# Patient Record
Sex: FEMALE | Race: WHITE | NOT HISPANIC OR LATINO | Employment: OTHER | URBAN - METROPOLITAN AREA
[De-identification: names, ages, dates, MRNs, and addresses within clinical notes are randomized per-mention and may not be internally consistent; named-entity substitution may affect disease eponyms.]

---

## 2022-10-07 LAB
CREATININE (EXTERNAL): 0.8 MG/DL (ref 0.7–1.4)
GFR ESTIMATED (EXTERNAL): >60 ML/MIN/1.73M2
GLUCOSE (EXTERNAL): 114 MG/DL (ref 60–99)
POTASSIUM (EXTERNAL): 3.9 MMOL/L (ref 3.5–5.1)

## 2022-10-14 ENCOUNTER — TRANSFERRED RECORDS (OUTPATIENT)
Dept: HEALTH INFORMATION MANAGEMENT | Facility: CLINIC | Age: 87
End: 2022-10-14

## 2022-10-14 ENCOUNTER — MEDICAL CORRESPONDENCE (OUTPATIENT)
Dept: HEALTH INFORMATION MANAGEMENT | Facility: CLINIC | Age: 87
End: 2022-10-14

## 2022-11-06 ENCOUNTER — LAB REQUISITION (OUTPATIENT)
Dept: LAB | Facility: CLINIC | Age: 87
End: 2022-11-06
Payer: MEDICARE

## 2022-11-06 DIAGNOSIS — I10 ESSENTIAL (PRIMARY) HYPERTENSION: ICD-10-CM

## 2022-11-06 DIAGNOSIS — R42 DIZZINESS AND GIDDINESS: ICD-10-CM

## 2022-11-06 DIAGNOSIS — E55.9 VITAMIN D DEFICIENCY, UNSPECIFIED: ICD-10-CM

## 2022-11-08 LAB
ANION GAP SERPL CALCULATED.3IONS-SCNC: 13 MMOL/L (ref 7–15)
BUN SERPL-MCNC: 18.3 MG/DL (ref 8–23)
CALCIUM SERPL-MCNC: 9.6 MG/DL (ref 8.2–9.6)
CHLORIDE SERPL-SCNC: 93 MMOL/L (ref 98–107)
CREAT SERPL-MCNC: 0.82 MG/DL (ref 0.51–0.95)
DEPRECATED HCO3 PLAS-SCNC: 21 MMOL/L (ref 22–29)
ERYTHROCYTE [DISTWIDTH] IN BLOOD BY AUTOMATED COUNT: 15.5 % (ref 10–15)
GFR SERPL CREATININE-BSD FRML MDRD: 67 ML/MIN/1.73M2
GLUCOSE SERPL-MCNC: 91 MG/DL (ref 70–99)
HCT VFR BLD AUTO: 36 % (ref 35–47)
HGB BLD-MCNC: 11.8 G/DL (ref 11.7–15.7)
MCH RBC QN AUTO: 30 PG (ref 26.5–33)
MCHC RBC AUTO-ENTMCNC: 32.8 G/DL (ref 31.5–36.5)
MCV RBC AUTO: 92 FL (ref 78–100)
PLATELET # BLD AUTO: 445 10E3/UL (ref 150–450)
POTASSIUM SERPL-SCNC: 4.4 MMOL/L (ref 3.4–5.3)
RBC # BLD AUTO: 3.93 10E6/UL (ref 3.8–5.2)
SODIUM SERPL-SCNC: 127 MMOL/L (ref 136–145)
TSH SERPL DL<=0.005 MIU/L-ACNC: 3.43 UIU/ML (ref 0.3–4.2)
VIT B12 SERPL-MCNC: 1036 PG/ML (ref 232–1245)
WBC # BLD AUTO: 18.4 10E3/UL (ref 4–11)

## 2022-11-08 PROCEDURE — 82607 VITAMIN B-12: CPT | Mod: ORL | Performed by: FAMILY MEDICINE

## 2022-11-08 PROCEDURE — P9603 ONE-WAY ALLOW PRORATED MILES: HCPCS | Mod: ORL | Performed by: FAMILY MEDICINE

## 2022-11-08 PROCEDURE — 36415 COLL VENOUS BLD VENIPUNCTURE: CPT | Mod: ORL | Performed by: FAMILY MEDICINE

## 2022-11-08 PROCEDURE — 85027 COMPLETE CBC AUTOMATED: CPT | Mod: ORL | Performed by: FAMILY MEDICINE

## 2022-11-08 PROCEDURE — 84443 ASSAY THYROID STIM HORMONE: CPT | Mod: ORL | Performed by: FAMILY MEDICINE

## 2022-11-08 PROCEDURE — 80048 BASIC METABOLIC PNL TOTAL CA: CPT | Mod: ORL | Performed by: FAMILY MEDICINE

## 2022-11-13 ENCOUNTER — LAB REQUISITION (OUTPATIENT)
Dept: LAB | Facility: CLINIC | Age: 87
End: 2022-11-13
Payer: MEDICARE

## 2022-11-13 DIAGNOSIS — D72.829 ELEVATED WHITE BLOOD CELL COUNT, UNSPECIFIED: ICD-10-CM

## 2022-11-13 DIAGNOSIS — E87.1 HYPO-OSMOLALITY AND HYPONATREMIA: ICD-10-CM

## 2022-11-15 LAB
ANION GAP SERPL CALCULATED.3IONS-SCNC: 14 MMOL/L (ref 7–15)
BUN SERPL-MCNC: 19.5 MG/DL (ref 8–23)
CALCIUM SERPL-MCNC: 9.7 MG/DL (ref 8.2–9.6)
CHLORIDE SERPL-SCNC: 94 MMOL/L (ref 98–107)
CREAT SERPL-MCNC: 0.85 MG/DL (ref 0.51–0.95)
DEPRECATED HCO3 PLAS-SCNC: 23 MMOL/L (ref 22–29)
ERYTHROCYTE [DISTWIDTH] IN BLOOD BY AUTOMATED COUNT: 15.7 % (ref 10–15)
GFR SERPL CREATININE-BSD FRML MDRD: 64 ML/MIN/1.73M2
GLUCOSE SERPL-MCNC: 94 MG/DL (ref 70–99)
HCT VFR BLD AUTO: 36.5 % (ref 35–47)
HGB BLD-MCNC: 11.7 G/DL (ref 11.7–15.7)
MCH RBC QN AUTO: 30 PG (ref 26.5–33)
MCHC RBC AUTO-ENTMCNC: 32.1 G/DL (ref 31.5–36.5)
MCV RBC AUTO: 94 FL (ref 78–100)
PLATELET # BLD AUTO: 458 10E3/UL (ref 150–450)
POTASSIUM SERPL-SCNC: 4.6 MMOL/L (ref 3.4–5.3)
RBC # BLD AUTO: 3.9 10E6/UL (ref 3.8–5.2)
SODIUM SERPL-SCNC: 131 MMOL/L (ref 136–145)
WBC # BLD AUTO: 13.1 10E3/UL (ref 4–11)

## 2022-11-15 PROCEDURE — 85027 COMPLETE CBC AUTOMATED: CPT | Mod: ORL | Performed by: FAMILY MEDICINE

## 2022-11-15 PROCEDURE — P9603 ONE-WAY ALLOW PRORATED MILES: HCPCS | Mod: ORL | Performed by: FAMILY MEDICINE

## 2022-11-15 PROCEDURE — 80048 BASIC METABOLIC PNL TOTAL CA: CPT | Mod: ORL | Performed by: FAMILY MEDICINE

## 2022-11-15 PROCEDURE — 36415 COLL VENOUS BLD VENIPUNCTURE: CPT | Mod: ORL | Performed by: FAMILY MEDICINE

## 2023-03-04 ENCOUNTER — LAB REQUISITION (OUTPATIENT)
Dept: LAB | Facility: CLINIC | Age: 88
End: 2023-03-04
Payer: MEDICARE

## 2023-03-04 DIAGNOSIS — E87.1 HYPO-OSMOLALITY AND HYPONATREMIA: ICD-10-CM

## 2023-03-07 LAB
ANION GAP SERPL CALCULATED.3IONS-SCNC: 11 MMOL/L (ref 7–15)
BUN SERPL-MCNC: 19 MG/DL (ref 8–23)
CALCIUM SERPL-MCNC: 9.5 MG/DL (ref 8.2–9.6)
CHLORIDE SERPL-SCNC: 98 MMOL/L (ref 98–107)
CREAT SERPL-MCNC: 0.83 MG/DL (ref 0.51–0.95)
DEPRECATED HCO3 PLAS-SCNC: 25 MMOL/L (ref 22–29)
GFR SERPL CREATININE-BSD FRML MDRD: 66 ML/MIN/1.73M2
GLUCOSE SERPL-MCNC: 131 MG/DL (ref 70–99)
POTASSIUM SERPL-SCNC: 3.9 MMOL/L (ref 3.4–5.3)
SODIUM SERPL-SCNC: 134 MMOL/L (ref 136–145)

## 2023-03-07 PROCEDURE — 36415 COLL VENOUS BLD VENIPUNCTURE: CPT | Mod: ORL | Performed by: FAMILY MEDICINE

## 2023-03-07 PROCEDURE — 80048 BASIC METABOLIC PNL TOTAL CA: CPT | Mod: ORL | Performed by: FAMILY MEDICINE

## 2023-03-07 PROCEDURE — P9603 ONE-WAY ALLOW PRORATED MILES: HCPCS | Mod: ORL | Performed by: FAMILY MEDICINE

## 2023-05-08 ENCOUNTER — LAB REQUISITION (OUTPATIENT)
Dept: LAB | Facility: CLINIC | Age: 88
End: 2023-05-08
Payer: MEDICARE

## 2023-05-08 DIAGNOSIS — I10 ESSENTIAL (PRIMARY) HYPERTENSION: ICD-10-CM

## 2023-05-09 LAB
ANION GAP SERPL CALCULATED.3IONS-SCNC: 13 MMOL/L (ref 7–15)
BUN SERPL-MCNC: 17.3 MG/DL (ref 8–23)
CALCIUM SERPL-MCNC: 9.6 MG/DL (ref 8.2–9.6)
CHLORIDE SERPL-SCNC: 94 MMOL/L (ref 98–107)
CREAT SERPL-MCNC: 0.81 MG/DL (ref 0.51–0.95)
DEPRECATED HCO3 PLAS-SCNC: 24 MMOL/L (ref 22–29)
ERYTHROCYTE [DISTWIDTH] IN BLOOD BY AUTOMATED COUNT: 14.6 % (ref 10–15)
GFR SERPL CREATININE-BSD FRML MDRD: 68 ML/MIN/1.73M2
GLUCOSE SERPL-MCNC: 84 MG/DL (ref 70–99)
HCT VFR BLD AUTO: 39.2 % (ref 35–47)
HGB BLD-MCNC: 12.9 G/DL (ref 11.7–15.7)
MCH RBC QN AUTO: 29.9 PG (ref 26.5–33)
MCHC RBC AUTO-ENTMCNC: 32.9 G/DL (ref 31.5–36.5)
MCV RBC AUTO: 91 FL (ref 78–100)
PLATELET # BLD AUTO: 380 10E3/UL (ref 150–450)
POTASSIUM SERPL-SCNC: 3.9 MMOL/L (ref 3.4–5.3)
RBC # BLD AUTO: 4.32 10E6/UL (ref 3.8–5.2)
SODIUM SERPL-SCNC: 131 MMOL/L (ref 136–145)
WBC # BLD AUTO: 13.7 10E3/UL (ref 4–11)

## 2023-05-09 PROCEDURE — P9603 ONE-WAY ALLOW PRORATED MILES: HCPCS | Mod: ORL | Performed by: FAMILY MEDICINE

## 2023-05-09 PROCEDURE — 80048 BASIC METABOLIC PNL TOTAL CA: CPT | Mod: ORL | Performed by: FAMILY MEDICINE

## 2023-05-09 PROCEDURE — 36415 COLL VENOUS BLD VENIPUNCTURE: CPT | Mod: ORL | Performed by: FAMILY MEDICINE

## 2023-05-09 PROCEDURE — 85027 COMPLETE CBC AUTOMATED: CPT | Mod: ORL | Performed by: FAMILY MEDICINE

## 2023-05-17 ENCOUNTER — TRANSFERRED RECORDS (OUTPATIENT)
Dept: HEALTH INFORMATION MANAGEMENT | Facility: CLINIC | Age: 88
End: 2023-05-17
Payer: COMMERCIAL

## 2023-05-18 ENCOUNTER — MEDICAL CORRESPONDENCE (OUTPATIENT)
Dept: HEALTH INFORMATION MANAGEMENT | Facility: CLINIC | Age: 88
End: 2023-05-18
Payer: COMMERCIAL

## 2023-05-25 ENCOUNTER — OFFICE VISIT (OUTPATIENT)
Dept: CARDIOLOGY | Facility: CLINIC | Age: 88
End: 2023-05-25
Payer: COMMERCIAL

## 2023-05-25 VITALS
RESPIRATION RATE: 16 BRPM | SYSTOLIC BLOOD PRESSURE: 131 MMHG | BODY MASS INDEX: 24.07 KG/M2 | DIASTOLIC BLOOD PRESSURE: 58 MMHG | HEIGHT: 64 IN | HEART RATE: 100 BPM | WEIGHT: 141 LBS

## 2023-05-25 DIAGNOSIS — I35.0 NONRHEUMATIC AORTIC VALVE STENOSIS: ICD-10-CM

## 2023-05-25 DIAGNOSIS — I10 BENIGN ESSENTIAL HYPERTENSION: ICD-10-CM

## 2023-05-25 DIAGNOSIS — I51.81 STRESS-INDUCED CARDIOMYOPATHY: Primary | ICD-10-CM

## 2023-05-25 DIAGNOSIS — I34.0 NONRHEUMATIC MITRAL VALVE REGURGITATION: ICD-10-CM

## 2023-05-25 PROCEDURE — 99204 OFFICE O/P NEW MOD 45 MIN: CPT | Performed by: INTERNAL MEDICINE

## 2023-05-25 RX ORDER — VITAMIN B COMPLEX
1 TABLET ORAL DAILY
COMMUNITY

## 2023-05-25 RX ORDER — MELOXICAM 15 MG/1
15 TABLET ORAL DAILY
COMMUNITY
Start: 2022-06-24

## 2023-05-25 RX ORDER — SOLIFENACIN SUCCINATE 5 MG/1
5 TABLET, FILM COATED ORAL DAILY
COMMUNITY

## 2023-05-25 RX ORDER — HYDROCHLOROTHIAZIDE 12.5 MG/1
12.5 TABLET ORAL DAILY
COMMUNITY
Start: 2022-10-19

## 2023-05-25 RX ORDER — LOSARTAN POTASSIUM 50 MG/1
50 TABLET ORAL DAILY
COMMUNITY
Start: 2022-10-19

## 2023-05-25 NOTE — PATIENT INSTRUCTIONS
Please contact direct nurses line Monday through Friday 8 AM to 5 PM @ [phone]    After-hours contact cardiology office at [phone].      Moderate aortic stenosis  Mild to moderate reduction in left ventricular systolic function.  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  LVEF: 40-45%.      Plan: Close monitoring of symptoms.  Follow-up in 6 months.

## 2023-05-25 NOTE — PROGRESS NOTES
HEART CARE ENCOUNTER CONSULTATON NOTE      [facility]  [phone]      Assessment/Recommendations   Assessment:   1. Mild to moderate reduction in systolic function.  Involving apical segments only of left ventricle.  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  Patient's   2 months ago after 69 years of marriage.  2.  Moderate aortic stenosis  3.  Hypertension  4.  Moderate mitral regurgitation    Plan:   1.  Long conversation with the patient and her daughter regarding findings of the echo.  Given she has no active cardiac symptoms she would like to defer starting further medications.  We did discuss starting beta-blocker therapy along with her losartan.  At this time she again would like to hold on starting further medications.  2.  Aspirin 81 mg daily  3.  Losartan 50 mg daily  4.  Patient will monitor symptoms very closely.  If she does developing any shortness of breath lower extremity edema lightheadedness chest pain will contact office immediately.  We will follow-up with patient in 6 months.       History of Present Illness/Subjective    HPI: Shoshana Yi is a 89 year old female history of hypertension who presents to cardiology clinic in consultation for new onset of left ventricular systolic dysfunction and moderate aortic stenosis.    Extensive conversation with the patient and her daughter.  Currently the patient notes no dyspnea on exertion lower extremity edema orthopnea or PND symptoms.  She denies any rapid palpitations.  No syncopal episodes.  No anginal chest pain.    Review of her echo she has a clear wall motion abnormality involving the mid to apical segments of the left ventricle.  The basal segments are spared.  This is most consistent with a stress-induced cardiomyopathy based on echo findings.  The patient's  of 69 years  2 months ago and she has been transitioning in her life now to assisted living.  She has been under  significant emotional stress related to the passing of her  which is understandable.    Long conversation with the patient regarding optimal medical therapy for this condition.  Beta-blocker would be optimal including metoprolol XL at at least 25 mg daily and titrating as tolerated.  At this time the patient would not like to add further medications to her regimen as she is asymptomatic.  She is on losartan at 50 mg daily.  She is willing to continue this medication.  She will take an aspirin 81 mg daily as well.    We will defer on further work-up for ischemia including stress test or coronary angiogram.  Again given she is asymptomatic the patient would like to defer all these testing.  She would consider if she has development of cardiac symptoms.  Pleasant    Echocardiogram Results:  1. Sinus rhythm during study.   2. Unable to obtain IV access for contrast use.   3. Normal LV size with mildly increased wall thickness. Calculated 3D   LVEF  40%. There are regional wall motion abnormalities. (Moderately reduced  function). Indeterminate diastolic function (Tissue Doppler invalid due to  annular calcification).   4. There is hypokinesis of the mid ventricle to entire apex. Some   regional  variation, with function worst at the mid-apical inferior and mid-apical  anterior wall. Findings consistent with multivessel disease or possible   stress  cardiomyopathy in the appropriate clinical context.   5. Normal RV size with normal systolic function.   6. Low flow moderate-severe aortic valve stenosis. Mean gradient 22 mmHg,  Peak velocity 2.98 m/s, valve area 0.97 cm2 (VTI). Dimensionless index   0.34.  Moderate aortic regurgitation. LVOT VTI SV 39ml/m2.   7. Severely calcified mitral annulus. Posterior mitral annular   calcification  that encroaches on the posterior leaflet.   8. Moderate mitral valve regurgitation.   9. There is moderate tricuspid valve regurgitation.   10. The estimated pulmonary artery  "systolic pressure is 44 mmHg.   11. Moderate LA enlargement.   12. Compared to the prior study from 2/11/2022, the current study reveals  new decrement in systolic function and new wall motion abnormalities.       Physical Examination  Review of Systems   Vitals: /58 (BP Location: Left arm, Patient Position: Sitting, Cuff Size: Adult Regular)   Pulse 100   Resp 16   Ht 1.626 m (5' 4\")   Wt 64 kg (141 lb)   BMI 24.20 kg/m    BMI= Body mass index is 24.2 kg/m .  Wt Readings from Last 3 Encounters:   05/25/23 64 kg (141 lb)        Pleasant   ENT/Mouth: membranes moist, no oral lesions or bleeding gums.      EYES:  no scleral icterus, normal conjunctivae       Chest/Lungs:   lungs are clear to auscultation, no rales or wheezing, no sternal scar, equal chest wall expansion    Cardiovascular:   Regular. Normal first and second heart sounds with 3/6 mid peaking systolic, holo-systolic murmur.  No rubs, or gallops; the carotid, radial and posterior tibial pulses are intact, Jugular venous pressure, no edema bilaterally.     Abdomen:  no tenderness; bowel sounds are present   Extremities: no cyanosis or clubbing   Skin: no xanthelasma, warm.    Neurologic: normal  bilateral, no tremors     Psychiatric: alert and oriented x3, calm        Please refer above for cardiac ROS details.        Medical History  Surgical History Family History Social History   Past Medical History:   Diagnosis Date     Aortic stenosis      Benign essential hypertension      No prior cardiac surgeries.  No family history of bicuspid acrotic valve.   Social History     Socioeconomic History     Marital status:      Spouse name: Not on file     Number of children: Not on file     Years of education: Not on file     Highest education level: Not on file   Occupational History     Not on file   Tobacco Use     Smoking status: Never     Smokeless tobacco: Never   Vaping Use     Vaping status: Never Used   Substance and Sexual Activity "     Alcohol use: Not on file     Drug use: Never     Sexual activity: Not on file   Other Topics Concern     Not on file   Social History Narrative     Not on file     Social Determinants of Health     Financial Resource Strain: Not on file   Food Insecurity: Not on file   Transportation Needs: Not on file   Physical Activity: Not on file   Stress: Not on file   Social Connections: Not on file   Intimate Partner Violence: Not on file   Housing Stability: Not on file           Medications  Allergies   Current Outpatient Medications   Medication Sig Dispense Refill     aspirin (ASA) 81 MG EC tablet Take 81 mg by mouth daily       diclofenac (VOLTAREN) 1 % topical gel Apply 2 g topically as needed       hydrochlorothiazide (HYDRODIURIL) 12.5 MG tablet Take 12.5 mg by mouth daily       losartan (COZAAR) 50 MG tablet Take 50 mg by mouth daily       meloxicam (MOBIC) 15 MG tablet Take 15 mg by mouth daily       solifenacin (VESICARE) 5 MG tablet Take 5 mg by mouth daily       Vitamin D3 (CHOLECALCIFEROL) 25 mcg (1000 units) tablet Take 1 tablet by mouth daily       No Known Allergies       Lab Results    Chemistry/lipid CBC Cardiac Enzymes/BNP/TSH/INR   No results for input(s): CHOL, HDL, LDL, TRIG, CHOLHDLRATIO in the last 52713 hours.  No results for input(s): LDL in the last 05938 hours.  Recent Labs   Lab Test 05/09/23  1003   *   POTASSIUM 3.9   CHLORIDE 94*   CO2 24   GLC 84   BUN 17.3   CR 0.81   GFRESTIMATED 68   ELIGIO 9.6     Recent Labs   Lab Test 05/09/23  1003 03/07/23  0725 11/15/22  1143   CR 0.81 0.83 0.85     No results for input(s): A1C in the last 45003 hours.       Recent Labs   Lab Test 05/09/23  1003   WBC 13.7*   HGB 12.9   HCT 39.2   MCV 91        Recent Labs   Lab Test 05/09/23  1003 11/15/22  1143 11/08/22  1032   HGB 12.9 11.7 11.8    No results for input(s): TROPONINI in the last 66393 hours.  No results for input(s): BNP, NTBNPI, NTBNP in the last 23808 hours.  Recent Labs   Lab Test  11/08/22  1032   TSH 3.43     No results for input(s): INR in the last 59839 hours.     Oneal Leach, DO

## 2023-05-25 NOTE — LETTER
2023    Ginny Sarabia MD  [facility] [address] [place] [id number]    RE: Shoshana Yi       Dear Colleague,     I had the pleasure of seeing Shoshana Yi in the [facility].    HEART CARE ENCOUNTER CONSULTATON NOTE      [facility]  [phone]      Assessment/Recommendations   Assessment:   1. Mild to moderate reduction in systolic function.  Involving apical segments only of left ventricle.  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  Patient's   2 months ago after 69 years of marriage.  2.  Moderate aortic stenosis  3.  Hypertension  4.  Moderate mitral regurgitation    Plan:   1.  Long conversation with the patient and her daughter regarding findings of the echo.  Given she has no active cardiac symptoms she would like to defer starting further medications.  We did discuss starting beta-blocker therapy along with her losartan.  At this time she again would like to hold on starting further medications.  2.  Aspirin 81 mg daily  3.  Losartan 50 mg daily  4.  Patient will monitor symptoms very closely.  If she does developing any shortness of breath lower extremity edema lightheadedness chest pain will contact office immediately.  We will follow-up with patient in 6 months.       History of Present Illness/Subjective    HPI: Shoshana Yi is a 89 year old female history of hypertension who presents to cardiology clinic in consultation for new onset of left ventricular systolic dysfunction and moderate aortic stenosis.    Extensive conversation with the patient and her daughter.  Currently the patient notes no dyspnea on exertion lower extremity edema orthopnea or PND symptoms.  She denies any rapid palpitations.  No syncopal episodes.  No anginal chest pain.    Review of her echo she has a clear wall motion abnormality involving the mid to apical segments of the left ventricle.  The basal segments are spared.  This is  most consistent with a stress-induced cardiomyopathy based on echo findings.  The patient's  of 69 years  2 months ago and she has been transitioning in her life now to assisted living.  She has been under significant emotional stress related to the passing of her  which is understandable.    Long conversation with the patient regarding optimal medical therapy for this condition.  Beta-blocker would be optimal including metoprolol XL at at least 25 mg daily and titrating as tolerated.  At this time the patient would not like to add further medications to her regimen as she is asymptomatic.  She is on losartan at 50 mg daily.  She is willing to continue this medication.  She will take an aspirin 81 mg daily as well.    We will defer on further work-up for ischemia including stress test or coronary angiogram.  Again given she is asymptomatic the patient would like to defer all these testing.  She would consider if she has development of cardiac symptoms.  Pleasant    Echocardiogram Results:  1. Sinus rhythm during study.   2. Unable to obtain IV access for contrast use.   3. Normal LV size with mildly increased wall thickness. Calculated 3D   LVEF  40%. There are regional wall motion abnormalities. (Moderately reduced  function). Indeterminate diastolic function (Tissue Doppler invalid due to  annular calcification).   4. There is hypokinesis of the mid ventricle to entire apex. Some   regional  variation, with function worst at the mid-apical inferior and mid-apical  anterior wall. Findings consistent with multivessel disease or possible   stress  cardiomyopathy in the appropriate clinical context.   5. Normal RV size with normal systolic function.   6. Low flow moderate-severe aortic valve stenosis. Mean gradient 22 mmHg,  Peak velocity 2.98 m/s, valve area 0.97 cm2 (VTI). Dimensionless index   0.34.  Moderate aortic regurgitation. LVOT VTI SV 39ml/m2.   7. Severely calcified mitral annulus.  "Posterior mitral annular   calcification  that encroaches on the posterior leaflet.   8. Moderate mitral valve regurgitation.   9. There is moderate tricuspid valve regurgitation.   10. The estimated pulmonary artery systolic pressure is 44 mmHg.   11. Moderate LA enlargement.   12. Compared to the prior study from 2/11/2022, the current study reveals  new decrement in systolic function and new wall motion abnormalities.       Physical Examination  Review of Systems   Vitals: /58 (BP Location: Left arm, Patient Position: Sitting, Cuff Size: Adult Regular)   Pulse 100   Resp 16   Ht 1.626 m (5' 4\")   Wt 64 kg (141 lb)   BMI 24.20 kg/m    BMI= Body mass index is 24.2 kg/m .  Wt Readings from Last 3 Encounters:   05/25/23 64 kg (141 lb)        Pleasant   ENT/Mouth: membranes moist, no oral lesions or bleeding gums.      EYES:  no scleral icterus, normal conjunctivae       Chest/Lungs:   lungs are clear to auscultation, no rales or wheezing, no sternal scar, equal chest wall expansion    Cardiovascular:   Regular. Normal first and second heart sounds with 3/6 mid peaking systolic, holo-systolic murmur.  No rubs, or gallops; the carotid, radial and posterior tibial pulses are intact, Jugular venous pressure, no edema bilaterally.     Abdomen:  no tenderness; bowel sounds are present   Extremities: no cyanosis or clubbing   Skin: no xanthelasma, warm.    Neurologic: normal  bilateral, no tremors     Psychiatric: alert and oriented x3, calm        Please refer above for cardiac ROS details.        Medical History  Surgical History Family History Social History   Past Medical History:   Diagnosis Date    Aortic stenosis     Benign essential hypertension      No prior cardiac surgeries.  No family history of bicuspid acrotic valve.   Social History     Socioeconomic History    Marital status:      Spouse name: Not on file    Number of children: Not on file    Years of education: Not on file    Highest " education level: Not on file   Occupational History    Not on file   Tobacco Use    Smoking status: Never    Smokeless tobacco: Never   Vaping Use    Vaping status: Never Used   Substance and Sexual Activity    Alcohol use: Not on file    Drug use: Never    Sexual activity: Not on file   Other Topics Concern    Not on file   Social History Narrative    Not on file     Social Determinants of Health     Financial Resource Strain: Not on file   Food Insecurity: Not on file   Transportation Needs: Not on file   Physical Activity: Not on file   Stress: Not on file   Social Connections: Not on file   Intimate Partner Violence: Not on file   Housing Stability: Not on file           Medications  Allergies   Current Outpatient Medications   Medication Sig Dispense Refill    aspirin (ASA) 81 MG EC tablet Take 81 mg by mouth daily      diclofenac (VOLTAREN) 1 % topical gel Apply 2 g topically as needed      hydrochlorothiazide (HYDRODIURIL) 12.5 MG tablet Take 12.5 mg by mouth daily      losartan (COZAAR) 50 MG tablet Take 50 mg by mouth daily      meloxicam (MOBIC) 15 MG tablet Take 15 mg by mouth daily      solifenacin (VESICARE) 5 MG tablet Take 5 mg by mouth daily      Vitamin D3 (CHOLECALCIFEROL) 25 mcg (1000 units) tablet Take 1 tablet by mouth daily       No Known Allergies       Lab Results    Chemistry/lipid CBC Cardiac Enzymes/BNP/TSH/INR   No results for input(s): CHOL, HDL, LDL, TRIG, CHOLHDLRATIO in the last 82894 hours.  No results for input(s): LDL in the last 42068 hours.  Recent Labs   Lab Test 05/09/23  1003   *   POTASSIUM 3.9   CHLORIDE 94*   CO2 24   GLC 84   BUN 17.3   CR 0.81   GFRESTIMATED 68   ELIGIO 9.6     Recent Labs   Lab Test 05/09/23  1003 03/07/23  0725 11/15/22  1143   CR 0.81 0.83 0.85     No results for input(s): A1C in the last 78790 hours.       Recent Labs   Lab Test 05/09/23  1003   WBC 13.7*   HGB 12.9   HCT 39.2   MCV 91        Recent Labs   Lab Test 05/09/23  1003  11/15/22  1143 11/08/22  1032   HGB 12.9 11.7 11.8    No results for input(s): TROPONINI in the last 90636 hours.  No results for input(s): BNP, NTBNPI, NTBNP in the last 60319 hours.  Recent Labs   Lab Test 11/08/22  1032   TSH 3.43     No results for input(s): INR in the last 18987 hours.     Oneal Leach DO        Thank you for allowing me to participate in the care of your patient.      Sincerely,     Oneal Leach DO     [facility]  cc:   [facility]

## 2023-09-18 ENCOUNTER — LAB REQUISITION (OUTPATIENT)
Dept: LAB | Facility: CLINIC | Age: 88
End: 2023-09-18
Payer: OTHER MISCELLANEOUS

## 2023-09-18 DIAGNOSIS — Z11.1 ENCOUNTER FOR SCREENING FOR RESPIRATORY TUBERCULOSIS: ICD-10-CM

## 2023-09-19 PROCEDURE — 36415 COLL VENOUS BLD VENIPUNCTURE: CPT | Mod: ORL | Performed by: FAMILY MEDICINE

## 2023-09-19 PROCEDURE — 86481 TB AG RESPONSE T-CELL SUSP: CPT | Mod: ORL | Performed by: FAMILY MEDICINE

## 2023-09-19 PROCEDURE — P9603 ONE-WAY ALLOW PRORATED MILES: HCPCS | Mod: ORL | Performed by: FAMILY MEDICINE

## 2023-09-21 LAB
GAMMA INTERFERON BACKGROUND BLD IA-ACNC: 0.06 IU/ML
M TB IFN-G BLD-IMP: NEGATIVE
M TB IFN-G CD4+ BCKGRND COR BLD-ACNC: 9.94 IU/ML
MITOGEN IGNF BCKGRD COR BLD-ACNC: 0 IU/ML
MITOGEN IGNF BCKGRD COR BLD-ACNC: 0 IU/ML
QUANTIFERON MITOGEN: 10 IU/ML
QUANTIFERON NIL TUBE: 0.06 IU/ML
QUANTIFERON TB1 TUBE: 0.06 IU/ML
QUANTIFERON TB2 TUBE: 0.06